# Patient Record
Sex: FEMALE | Race: OTHER | HISPANIC OR LATINO | URBAN - METROPOLITAN AREA
[De-identification: names, ages, dates, MRNs, and addresses within clinical notes are randomized per-mention and may not be internally consistent; named-entity substitution may affect disease eponyms.]

---

## 2024-01-11 ENCOUNTER — HOSPITAL ENCOUNTER (EMERGENCY)
Facility: HOSPITAL | Age: 29
Discharge: HOME | End: 2024-01-11
Attending: EMERGENCY MEDICINE

## 2024-01-11 VITALS
HEIGHT: 62 IN | DIASTOLIC BLOOD PRESSURE: 74 MMHG | TEMPERATURE: 100 F | RESPIRATION RATE: 20 BRPM | OXYGEN SATURATION: 98 % | HEART RATE: 97 BPM | WEIGHT: 105 LBS | BODY MASS INDEX: 19.32 KG/M2 | SYSTOLIC BLOOD PRESSURE: 106 MMHG

## 2024-01-11 DIAGNOSIS — J10.1 INFLUENZA B: Primary | ICD-10-CM

## 2024-01-11 LAB
B-HCG UR QL: NEGATIVE
CTP QC/QA: YES
INFLUENZA A, MOLECULAR: NEGATIVE
INFLUENZA B, MOLECULAR: POSITIVE
SARS-COV-2 RDRP RESP QL NAA+PROBE: NEGATIVE
SPECIMEN SOURCE: ABNORMAL

## 2024-01-11 PROCEDURE — 99283 EMERGENCY DEPT VISIT LOW MDM: CPT

## 2024-01-11 PROCEDURE — U0002 COVID-19 LAB TEST NON-CDC: HCPCS | Performed by: EMERGENCY MEDICINE

## 2024-01-11 PROCEDURE — 81025 URINE PREGNANCY TEST: CPT | Performed by: NURSE PRACTITIONER

## 2024-01-11 PROCEDURE — 25000003 PHARM REV CODE 250: Performed by: NURSE PRACTITIONER

## 2024-01-11 PROCEDURE — 87502 INFLUENZA DNA AMP PROBE: CPT | Performed by: NURSE PRACTITIONER

## 2024-01-11 RX ORDER — IBUPROFEN 600 MG/1
600 TABLET ORAL
Status: COMPLETED | OUTPATIENT
Start: 2024-01-11 | End: 2024-01-11

## 2024-01-11 RX ORDER — ONDANSETRON 4 MG/1
4 TABLET, ORALLY DISINTEGRATING ORAL
Status: COMPLETED | OUTPATIENT
Start: 2024-01-11 | End: 2024-01-11

## 2024-01-11 RX ADMIN — IBUPROFEN 600 MG: 600 TABLET, FILM COATED ORAL at 12:01

## 2024-01-11 RX ADMIN — ONDANSETRON 4 MG: 4 TABLET, ORALLY DISINTEGRATING ORAL at 12:01

## 2024-01-11 NOTE — ED PROVIDER NOTES
Encounter Date: 1/11/2024       History     Chief Complaint   Patient presents with    Cough     Pt c/o cough, earache and chest pain with cough       28-year-old female presents to emergency department due to right-sided otalgia, fevers and cough x5 days.  Patient is accompanied by her wife who reports the fever has improved over the last 24 hours.  However the patient reports fatigue and generally feeling unwell.  The symptoms began initially with otalgia.  She also has chest discomfort but only when coughing.  She has taken OTCs for fever reducing which has seemed to help.  Patient was experienced 1 episode of emesis yesterday morning.  However she was still able to tolerate p.o. liquids and solids occasionally.  Patient was not endorsing abdominal pain.  No dysuria or hematuria.  No changes in bowels.    Review of patient's allergies indicates:  No Known Allergies  No past medical history on file.  No past surgical history on file.  No family history on file.     Review of Systems  See HPI  Physical Exam     Initial Vitals [01/11/24 1215]   BP Pulse Resp Temp SpO2   136/83 (!) 116 18 99.1 °F (37.3 °C) 98 %      MAP       --         Physical Exam    Vitals reviewed.  Constitutional: She appears well-developed.   Patient appears uncomfortable secondary to symptoms.   HENT:   Head: Normocephalic and atraumatic.   Right Ear: Tympanic membrane is injected.   Mouth/Throat: Uvula is midline, oropharynx is clear and moist and mucous membranes are normal. No uvula swelling. No oropharyngeal exudate or posterior oropharyngeal edema.   Eyes: Conjunctivae and EOM are normal.   Neck:   Normal range of motion.  Cardiovascular:  Normal rate.           Pulmonary/Chest: No respiratory distress.   Abdominal: Abdomen is soft. She exhibits no distension.   Musculoskeletal:         General: Normal range of motion.      Cervical back: Normal range of motion.     Neurological: She is alert and oriented to person, place, and time.    Skin: Skin is warm and dry.   Psychiatric: She has a normal mood and affect. Thought content normal.       ED Course   Procedures  Labs Reviewed   INFLUENZA A & B BY MOLECULAR - Abnormal; Notable for the following components:       Result Value    Influenza B, Molecular Positive (*)     All other components within normal limits   SARS-COV-2 RNA AMPLIFICATION, QUAL   HIV 1 / 2 ANTIBODY   HEPATITIS C ANTIBODY   POCT URINE PREGNANCY          Imaging Results    None          Medications   ondansetron disintegrating tablet 4 mg (4 mg Oral Given 1/11/24 1248)   ibuprofen tablet 600 mg (600 mg Oral Given 1/11/24 1248)     Medical Decision Making  28-year-old female presents due to myalgias cough and otalgia.  Differential diagnosis includes otitis externa, otitis media, COVID, influenza.  Patient influenza positive, I believe erythema of otalgia secondary to viral syndrome.  Discussed symptomatic management with patient as well as return precautions.               Attending Attestation:     Physician Attestation Statement for NP/PA:       Other NP/PA Attestation Additions:      Medical Decision Making: I was present and available for discussion/questions in the Emergency Department during the patient's evaluation. However, I did not discuss this case with the KEVIN during that evaluation, and I did not perform a face to face evaluation. I have performed quality review of the case post discharge. Patient presented with URI type symptoms, low grade temps, initial tachycardia, and was found to be influenza B positive. The patient improved with ibuprofen and zofran. Tachycardia resolved. Po challenge performed. Patient with reassuring vitals at that point and deemed appropriate for discharge with supportive care measures for influenza B. Workup/dispo and plan seem appropriate per my review of the chart.    JOHNIE Hernandez MD                                  Clinical Impression:  Final diagnoses:  [J10.1] Influenza B (Primary)                  Myla Jacome PA-C  01/11/24 1414       Charmaine Hernandez MD  01/14/24 2760

## 2024-01-11 NOTE — DISCHARGE INSTRUCTIONS
Take Tylenol for fever  Increase fluid intake  Rest  Wear a mask around others   You can also take decongestant such as Pseudoephedrine, this will help drain the congestion and provide relief of ear pain   Viruses improve on their own, no antibiotic will be effective.

## 2024-01-11 NOTE — Clinical Note
"Alize Godwin"Jose Cruz was seen and treated in our emergency department on 1/11/2024.  She may return to work on 01/15/2024.       If you have any questions or concerns, please don't hesitate to call.      Myla Jacome PA-C"

## 2024-01-11 NOTE — FIRST PROVIDER EVALUATION
Emergency Department TeleTriage Encounter Note      CHIEF COMPLAINT    Chief Complaint   Patient presents with    Cough     Pt c/o cough, earache and chest pain with cough         VITAL SIGNS   Initial Vitals [01/11/24 1215]   BP Pulse Resp Temp SpO2   136/83 (!) 116 18 99.1 °F (37.3 °C) 98 %      MAP       --            ALLERGIES    Review of patient's allergies indicates:  Not on File    PROVIDER TRIAGE NOTE  This is a teletriage evaluation of a 28 y.o. female presenting to the ED complaining of body aches, cough, fever, and ear pain for the past few days. Exposure to influenza. Reports CP with cough. Has vomited twice today.     Alert, ill appearing. Does not appear toxic.     Initial orders will be placed and care will be transferred to an alternate provider when patient is roomed for a full evaluation. Any additional orders and the final disposition will be determined by that provider.         ORDERS  Labs Reviewed   INFLUENZA A & B BY MOLECULAR   HIV 1 / 2 ANTIBODY   HEPATITIS C ANTIBODY   SARS-COV-2 RDRP GENE   POCT URINE PREGNANCY       ED Orders (720h ago, onward)      Start Ordered     Status Ordering Provider    01/11/24 1245 01/11/24 1242  ondansetron disintegrating tablet 4 mg  ED 1 Time         Ordered DORA WHITMORE N.    01/11/24 1245 01/11/24 1242  ibuprofen tablet 600 mg  ED 1 Time         Ordered DORA WHITMORE N.    01/11/24 1243 01/11/24 1242  Give PO Challenge  Once         Ordered DORA WHITMORE N.    01/11/24 1242 01/11/24 1242  POCT COVID-19 Rapid Screening  Once         Ordered DORA WHITMORE N.    01/11/24 1242 01/11/24 1242  Influenza A & B by Molecular  Once         Ordered DORA WHITMORE N.    01/11/24 1242 01/11/24 1242  POCT urine pregnancy  Once         Ordered DORA WHITMORE N.    01/11/24 1217 01/11/24 1217  HIV 1/2 Ag/Ab (4th Gen)  STAT         Ordered CITLALY HIRSCH    01/11/24 1217 01/11/24 1217  Hepatitis C Antibody  STAT          CITLALY Marquez              Virtual Visit Note: The provider triage portion of this emergency department evaluation and documentation was performed via [employer], a HIPAA-compliant telemedicine application, in concert with a tele-presenter in the room. A face to face patient evaluation with one of my colleagues will occur once the patient is placed in an emergency department room.      DISCLAIMER: This note was prepared with [employer] voice recognition transcription software. Garbled syntax, mangled pronouns, and other bizarre constructions may be attributed to that software system.

## 2024-08-13 ENCOUNTER — HOSPITAL ENCOUNTER (EMERGENCY)
Facility: HOSPITAL | Age: 29
Discharge: HOME | End: 2024-08-13
Attending: EMERGENCY MEDICINE

## 2024-08-13 VITALS
TEMPERATURE: 99 F | SYSTOLIC BLOOD PRESSURE: 122 MMHG | OXYGEN SATURATION: 100 % | HEIGHT: 62 IN | DIASTOLIC BLOOD PRESSURE: 83 MMHG | HEART RATE: 78 BPM | RESPIRATION RATE: 12 BRPM | WEIGHT: 104.94 LBS | BODY MASS INDEX: 19.31 KG/M2

## 2024-08-13 DIAGNOSIS — S51.812A LACERATION OF LEFT FOREARM, INITIAL ENCOUNTER: Primary | ICD-10-CM

## 2024-08-13 PROCEDURE — 63600175 PHARM REV CODE 636 W HCPCS

## 2024-08-13 PROCEDURE — 12001 RPR S/N/AX/GEN/TRNK 2.5CM/<: CPT

## 2024-08-13 PROCEDURE — 99284 EMERGENCY DEPT VISIT MOD MDM: CPT | Mod: 25

## 2024-08-13 PROCEDURE — 90471 IMMUNIZATION ADMIN: CPT

## 2024-08-13 PROCEDURE — 90715 TDAP VACCINE 7 YRS/> IM: CPT

## 2024-08-13 PROCEDURE — 25000003 PHARM REV CODE 250

## 2024-08-13 RX ORDER — LIDOCAINE HYDROCHLORIDE 10 MG/ML
5 INJECTION, SOLUTION EPIDURAL; INFILTRATION; INTRACAUDAL; PERINEURAL
Status: COMPLETED | OUTPATIENT
Start: 2024-08-13 | End: 2024-08-13

## 2024-08-13 RX ADMIN — LIDOCAINE HYDROCHLORIDE 50 MG: 10 SOLUTION INTRAVENOUS at 04:08

## 2024-08-13 RX ADMIN — TETANUS TOXOID, REDUCED DIPHTHERIA TOXOID AND ACELLULAR PERTUSSIS VACCINE, ADSORBED 0.5 ML: 5; 2.5; 8; 8; 2.5 SUSPENSION INTRAMUSCULAR at 05:08

## 2024-08-13 NOTE — ED PROVIDER NOTES
Encounter Date: 8/13/2024       History     Chief Complaint   Patient presents with    Laceration     Pt arrives with laceration to left wrist after cutting self with knife at work.     Patient is a 28-year-old female presenting for cut her wrist which happened at work.  Patient states that she was cutting through some vegetable and it hit in the knife came through and hit her wrist.  This was written to assist by the friend that was in the room.  Patient states that her coworkers told her to come to the hospital to get it repaired.  Patient is unsure as to her Tdap status.  Patient washed the wound out with hydrogen peroxide at work and soap and water.  Patient controlled the bleeding with direct pressure.  Patient denies other complaint at this time.            Review of patient's allergies indicates:  No Known Allergies  History reviewed. No pertinent past medical history.  History reviewed. No pertinent surgical history.  No family history on file.  Social History     Tobacco Use    Smoking status: Unknown     Review of Systems    Physical Exam     Initial Vitals [08/13/24 0236]   BP Pulse Resp Temp SpO2   120/79 80 12 98.2 °F (36.8 °C) 100 %      MAP       --         Physical Exam    Constitutional: She appears well-developed and well-nourished.   HENT:   Head: Normocephalic and atraumatic.   Cardiovascular:  Normal rate, regular rhythm, normal heart sounds and intact distal pulses.           Musculoskeletal:         General: No edema. Normal range of motion.      Comments: Affected arm is neurologically intact.  There is distal pulses cap refill distal to the site of injury.  Bleeding stopped at this time secondary to direct pressure.  Wound is approximately 2-1/2 cm long on the left lateral aspect of the radial bone.    Patient is neurologically intact across the entire dermatome.         Skin: Skin is warm and dry.   Psychiatric: She has a normal mood and affect. Her behavior is normal. Judgment and thought  content normal.         ED Course   Lac Repair    Date/Time: 8/13/2024 10:17 AM    Performed by: Mike Oro MD  Authorized by: Sol Chavarria MD    Consent:     Consent obtained:  Verbal (Verbal consent obtained using .)    Consent given by:  Patient  Treatment:     Irrigation method:  Pressure wash    Visualized foreign bodies/material removed: no    Skin repair:     Repair method:  Sutures    Suture size:  4-0    Suture material:  Nylon    Suture technique:  Simple interrupted    Number of sutures:  5  Approximation:     Approximation:  Close  Repair type:     Repair type:  Intermediate    Labs Reviewed - No data to display       Imaging Results    None          Medications - No data to display  Medical Decision Making    Differential diagnosis includes but is not limited to laceration resulting in muscle and nerve damage, soft tissue infection, retained foreign body, as well as compartment syndrome.    Life-threatening diagnosis considered are compartment syndrome, rhabdomyolysis.  Both of which are unlikely in the setting a soft forearm compartment, pain that is not out of proportion to the injury, distal pulses, normal intact sensation in dermatomes of the hand.  Additional life-threatening diagnosis considered was nonsuicidal self-harm versus suicidality.  Both of which are unlikely in the setting of a witnessed injury at work in front of multiple people, as well as patient stating that she was cutting vegetables at that time.  Additionally we considered the possibility of a tetanus infection and prophylactically treated with Tdap as the patient did not know her vaccination status.         ID number used for expansion rotation is [id number].  Patient has consented for procedure.  Patient explained risks benefits.  Patient agrees with plan and understands that she needs to return in 7 days for suture removal.    Amount and/or Complexity of Data Reviewed  Independent Historian:  friend     Details: Friend states that she was with the patient when she cut her wrist.  Friends did states that this was done at work, she denies suspicion of suicidality or nonsuicidal self-harm attempt.  External Data Reviewed: labs and notes.     Details: Previous labs from 01/11/2024 were notable for influenza B infection.    Attempt to obtain previous notes included chart review and care everywhere.  Patient appears to have no interactions with outside hospitals.  Labs:      Details: In this case labs were likely not necessary in the setting of a forearm laceration without additional complaint.  We considered need for labs at this time however shared decision-making with the patient we elected to forego labs as the patient denied systemic symptoms fever chills nausea vomiting.  Radiology:      Details: We considered need for imaging however patient had full range of motion, soft tissue compartment, and she was intact neurologically.    Risk  Prescription drug management.  Diagnosis or treatment significantly limited by social determinants of health.  Risk Details: Patient has moderate risk given the fact that she is otherwise healthy and tolerated procedure well.                                      Clinical Impression:  Simple laceration              Oro, MD Mike  Resident  08/14/24 [id number]

## 2024-08-13 NOTE — Clinical Note
"Alize Godwin"Jose Cruz was seen and treated in our emergency department on 8/13/2024.  She may return to work on 08/14/2024.       If you have any questions or concerns, please don't hesitate to call.      Mike Oro MD"

## 2024-08-13 NOTE — ED TRIAGE NOTES
Alize Cruz, a 28 y.o. female presents to the ED w/ complaint of     Triage note:  Chief Complaint   Patient presents with    Laceration     Pt arrives with laceration to left wrist after cutting self with knife at work.     Review of patient's allergies indicates:  No Known Allergies  No past medical history on file.      LOC: The patient is awake, alert, aware of environment with an appropriate affect. Oriented x4, speaking appropriately  APPEARANCE: Pt resting comfortably, in no acute distress, pt is clean and well groomed, clothing properly fastened  SKIN:The skin is warm and dry, color consistent with ethnicity, patient has normal skin turgor and moist mucus membranes, no bruising noted. Laceration to L wrist   RESPIRATORY:Airway is open and patent, respirations are spontaneous, patient has a normal effort and rate, no accessory muscle use noted.  CARDIAC: Normal rate and rhythm, no peripheral edema noted, capillary refill < 3 seconds, bilateral radial pulses 2+.  ABDOMEN: Soft, non tender, non distended. Bowel sounds present x 4 quadrants.   NEUROLOGIC: PERRLA, facial expression is symmetrical, patient moving all extremities spontaneously, normal sensation in all extremities when touched with a finger.  Follows all commands appropriately  MUSCULOSKELETAL: Patient moving all extremities spontaneously, no obvious swelling or deformities noted.